# Patient Record
Sex: MALE | Race: WHITE | NOT HISPANIC OR LATINO | Employment: PART TIME | URBAN - METROPOLITAN AREA
[De-identification: names, ages, dates, MRNs, and addresses within clinical notes are randomized per-mention and may not be internally consistent; named-entity substitution may affect disease eponyms.]

---

## 2024-05-31 ENCOUNTER — OFFICE VISIT (OUTPATIENT)
Dept: URGENT CARE | Facility: CLINIC | Age: 34
End: 2024-05-31

## 2024-05-31 VITALS
TEMPERATURE: 98 F | SYSTOLIC BLOOD PRESSURE: 175 MMHG | WEIGHT: 155 LBS | OXYGEN SATURATION: 98 % | HEART RATE: 79 BPM | BODY MASS INDEX: 21.7 KG/M2 | RESPIRATION RATE: 16 BRPM | HEIGHT: 71 IN | DIASTOLIC BLOOD PRESSURE: 94 MMHG

## 2024-05-31 DIAGNOSIS — R11.0 NAUSEA IN ADULT: Primary | ICD-10-CM

## 2024-05-31 DIAGNOSIS — R63.0 LOSS OF APPETITE: ICD-10-CM

## 2024-05-31 DIAGNOSIS — R19.5 CHANGE IN STOOL: ICD-10-CM

## 2024-05-31 LAB
AMYLASE SERPL-CCNC: 46 U/L (ref 20–110)
ANION GAP SERPL CALC-SCNC: 11 MMOL/L (ref 8–16)
BUN SERPL-MCNC: 9 MG/DL (ref 6–20)
CALCIUM SERPL-MCNC: 10.8 MG/DL (ref 8.7–10.5)
CHLORIDE SERPL-SCNC: 106 MMOL/L (ref 95–110)
CO2 SERPL-SCNC: 24 MMOL/L (ref 23–29)
CREAT SERPL-MCNC: 0.9 MG/DL (ref 0.5–1.4)
CTP QC/QA: YES
EST. GFR  (NO RACE VARIABLE): >60 ML/MIN/1.73 M^2
GLUCOSE SERPL-MCNC: 91 MG/DL (ref 70–110)
LIPASE SERPL-CCNC: 13 U/L (ref 4–60)
POTASSIUM SERPL-SCNC: 4.7 MMOL/L (ref 3.5–5.1)
SARS-COV-2 AG RESP QL IA.RAPID: NEGATIVE
SODIUM SERPL-SCNC: 141 MMOL/L (ref 136–145)

## 2024-05-31 PROCEDURE — 82150 ASSAY OF AMYLASE: CPT | Performed by: FAMILY MEDICINE

## 2024-05-31 PROCEDURE — 80048 BASIC METABOLIC PNL TOTAL CA: CPT | Performed by: FAMILY MEDICINE

## 2024-05-31 PROCEDURE — 36415 COLL VENOUS BLD VENIPUNCTURE: CPT | Performed by: FAMILY MEDICINE

## 2024-05-31 PROCEDURE — 87811 SARS-COV-2 COVID19 W/OPTIC: CPT | Mod: QW,TIER,S$GLB, | Performed by: FAMILY MEDICINE

## 2024-05-31 PROCEDURE — 99204 OFFICE O/P NEW MOD 45 MIN: CPT | Mod: TIER,S$GLB,, | Performed by: FAMILY MEDICINE

## 2024-05-31 PROCEDURE — 83690 ASSAY OF LIPASE: CPT | Performed by: FAMILY MEDICINE

## 2024-05-31 NOTE — PROGRESS NOTES
"Subjective:      Patient ID: Lalo Moise is a 33 y.o. male.    Vitals:  height is 5' 11" (1.803 m) and weight is 70.3 kg (155 lb). His oral temperature is 98.2 °F (36.8 °C). His blood pressure is 175/94 (abnormal) and his pulse is 79. His respiration is 16 and oxygen saturation is 98%.     Chief Complaint: Stool Color Change    Patient reports stool color change (now yellow) with mucus or blood X 1 day. He has no abdominal pain. He reports change in appetite that started yesterday. Patient reports similar symptoms happened about 3 months ago. Patient is concerned about his pancreas. Patient requesting BMP testing in addition to pancreatitic enzyme testing. He is willing to have COVID testing.        Constitution: Positive for appetite change. Negative for activity change, chills, sweating, fatigue, fever, unexpected weight change and generalized weakness.   Cardiovascular:  Negative for chest pain, leg swelling and sob on exertion.   Respiratory:  Negative for chest tightness, cough, sputum production, bloody sputum and COPD.    Gastrointestinal:  Positive for nausea. Negative for abdominal pain, vomiting, bright red blood in stool, heartburn and bowel incontinence.   Skin:  Negative for color change and rash.      Objective:     Vitals:    05/31/24 1248   BP: (!) 175/94   BP Location: Left arm   Patient Position: Sitting   BP Method: Large (Automatic)   Pulse: 79   Resp: 16   Temp: 98.2 °F (36.8 °C)   TempSrc: Oral   SpO2: 98%   Weight: 70.3 kg (155 lb)   Height: 5' 11" (1.803 m)      Physical Exam   Constitutional: He is oriented to person, place, and time. He appears well-developed.  Non-toxic appearance. He does not appear ill. No distress.   HENT:   Head: Normocephalic and atraumatic.   Ears:   Right Ear: External ear normal.   Left Ear: External ear normal.   Nose: Nose normal.   Mouth/Throat: Mucous membranes are normal.   Eyes: Conjunctivae and lids are normal. Right eye exhibits no discharge. Left eye " exhibits no discharge. No scleral icterus.   Neck: Trachea normal. Neck supple.   Cardiovascular: Normal rate, regular rhythm and normal heart sounds.   Pulmonary/Chest: Effort normal and breath sounds normal. No respiratory distress.   Abdominal: Normal appearance and bowel sounds are normal. He exhibits no distension and no mass. Soft. There is no abdominal tenderness. There is no rebound and no guarding. No hernia.      Comments: No tenderness over McBurney's. Negative Elias's sign.    Neurological: He is alert and oriented to person, place, and time. He has normal strength.   Skin: Skin is warm, intact, not diaphoretic and no rash. Capillary refill takes less than 2 seconds.   Psychiatric: His speech is normal and behavior is normal. Judgment and thought content normal.   Nursing note and vitals reviewed.    Results for orders placed or performed in visit on 05/31/24   SARS Coronavirus 2 Antigen, POCT Manual Read   Result Value Ref Range    SARS Coronavirus 2 Antigen Negative Negative     Acceptable Yes           Assessment:     1. Nausea in adult    2. Loss of appetite    3. Change in stool        Plan:       Nausea in adult  -     AMYLASE  -     LIPASE  -     BASIC METABOLIC PANEL  -     Ambulatory referral/consult to Gastroenterology    2. Loss of appetite  -     SARS Coronavirus 2 Antigen, POCT Manual Read  -     Ambulatory referral/consult to Gastroenterology    3. Change in stool  -     Ambulatory referral/consult to Gastroenterology      Patient Instructions   I have placed GI referral  Call to schedule an appointment: [phone]

## 2024-06-01 ENCOUNTER — TELEPHONE (OUTPATIENT)
Dept: URGENT CARE | Facility: CLINIC | Age: 34
End: 2024-06-01

## 2024-06-01 NOTE — TELEPHONE ENCOUNTER
Results discussed with patient: slightly elevated calcium in patient taking Tums- recommend CMP in 1-2 weeks for reevaluation of calcium with documented albumin, make sure to push fluids/ hydrate, a 0.3 point elevated calcium without symptoms concerning for kidney stones or acutely life threatening condition requires no acute intervention at this time. Pancreatic enzymes normal and patient clinically does not have signs of pancreatitis. GI consultation and close follow up with primary care recommended- patient will consider these recommendations but has not yet made up his mind about this. All questions answered. Patient verbalized understanding.